# Patient Record
Sex: MALE | Race: WHITE | NOT HISPANIC OR LATINO | Employment: FULL TIME | URBAN - NONMETROPOLITAN AREA
[De-identification: names, ages, dates, MRNs, and addresses within clinical notes are randomized per-mention and may not be internally consistent; named-entity substitution may affect disease eponyms.]

---

## 2020-09-03 ENCOUNTER — OFFICE VISIT (OUTPATIENT)
Dept: ORTHOPEDIC SURGERY | Facility: CLINIC | Age: 45
End: 2020-09-03

## 2020-09-03 VITALS — HEIGHT: 67 IN | BODY MASS INDEX: 31.47 KG/M2 | WEIGHT: 200.5 LBS

## 2020-09-03 DIAGNOSIS — E11.9 TYPE 2 DIABETES MELLITUS WITHOUT COMPLICATION, WITHOUT LONG-TERM CURRENT USE OF INSULIN (HCC): ICD-10-CM

## 2020-09-03 DIAGNOSIS — G89.29 CHRONIC LEFT SHOULDER PAIN: ICD-10-CM

## 2020-09-03 DIAGNOSIS — M77.12 LATERAL EPICONDYLITIS, LEFT ELBOW: Primary | ICD-10-CM

## 2020-09-03 DIAGNOSIS — M79.602 LEFT ARM PAIN: ICD-10-CM

## 2020-09-03 DIAGNOSIS — M25.512 CHRONIC LEFT SHOULDER PAIN: ICD-10-CM

## 2020-09-03 PROCEDURE — 99203 OFFICE O/P NEW LOW 30 MIN: CPT | Performed by: ORTHOPAEDIC SURGERY

## 2020-09-03 RX ORDER — LISINOPRIL 10 MG/1
TABLET ORAL
COMMUNITY
Start: 2020-03-16 | End: 2020-09-13

## 2020-09-03 RX ORDER — ROSUVASTATIN CALCIUM 10 MG/1
10 TABLET, COATED ORAL
COMMUNITY
Start: 2020-05-27

## 2020-09-03 NOTE — PROGRESS NOTES
Mukul Hillman is a 45 y.o. male   Primary provider:  Binh May MD       Chief Complaint   Patient presents with   • Left Upper Arm - Pain   • Left Elbow - Pain   • Left Shoulder - Pain   • Establish Care       HISTORY OF PRESENT ILLNESS: Patient being seen for left upper arm pain. Reports no known injury. No recent x-rays done.   Patient is having pain in his left shoulder and left elbow.  He mostly has pain with lifting and pulling.  Patient states that he has no specific injury but is been having intermittent pain for approximately 1 to 2 years.  He feels like he has a nodule in his deltoid.  He has some occasional numbness and tingling but it goes away with positional change.  His pain is worse with activity.  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  He reports no pain at rest.  He is not having difficulty sleeping at night.  Pain in his elbow seems to be a worse and affects him lifting pulling and tugging.    Pain   This is a chronic problem. The current episode started more than 1 year ago (2 years). Associated symptoms comments: aching. He has tried ice and heat for the symptoms.        CONCURRENT MEDICAL HISTORY:    Past Medical History:   Diagnosis Date   • Diabetes (CMS/[facility])        Allergies   Allergen Reactions   • Moxifloxacin Hives   • Sulfa Antibiotics Hives         Current Outpatient Medications:   •  lisinopril (PRINIVIL,ZESTRIL) 10 MG tablet, TAKE 1 TABLET BY MOUTH EVERY DAY, Disp: , Rfl:   •  metFORMIN (GLUCOPHAGE) 850 MG tablet, TAKE 1 TABLET (850 MG) BY MOUTH 2 TIMES DAILY (WITH MEALS), Disp: , Rfl:   •  rosuvastatin (CRESTOR) 10 MG tablet, Take 10 mg by mouth every night at bedtime., Disp: , Rfl:     History reviewed. No pertinent surgical history.    Family History   Problem Relation Age of Onset   • Hypertension Other         Social History     Socioeconomic History   • Marital status:      Spouse name: Not on file   • Number of children: Not on file   • Years of education:  "Not on file   • Highest education level: Not on file   Tobacco Use   • Smoking status: Current Every Day Smoker     Packs/day: 1.00     Years: 33.00     Pack years: 33.00     Types: Cigarettes   • Smokeless tobacco: Never Used   Substance and Sexual Activity   • Alcohol use: Yes     Alcohol/week: 3.0 standard drinks     Types: 3 Cans of beer per week        Review of Systems   Constitutional: Negative.    HENT: Negative.    Eyes: Negative.    Respiratory: Negative.    Cardiovascular: Negative.    Gastrointestinal: Negative.    Endocrine: Negative.    Genitourinary: Negative.    Musculoskeletal:        Elbow and left shoulder pain   Skin: Negative.    Allergic/Immunologic: Negative.    Neurological: Negative.    Hematological: Negative.    Psychiatric/Behavioral: Negative.        PHYSICAL EXAMINATION:       Ht 170.2 cm (67\")   Wt 90.9 kg (200 lb 8 oz)   BMI 31.40 kg/m²     Physical Exam   Constitutional: He is oriented to person, place, and time. He appears well-developed and well-nourished.   Neurological: He is alert and oriented to person, place, and time.   Psychiatric: He has a normal mood and affect. His behavior is normal. Judgment and thought content normal.       GAIT:     [x]  Normal  []  Antalgic    Assistive device: [x]  None  []  Walker     []  Crutches  []  Cane     []  Wheelchair  []  Stretcher    Right Elbow Exam     Tenderness   The patient is experiencing no tenderness.     Range of Motion   The patient has normal right elbow ROM.    Muscle Strength   The patient has normal right elbow strength.    Tests   Varus: negative  Valgus: negative    Other   Erythema: absent  Sensation: normal  Pulse: present      Left Elbow Exam     Tenderness   The patient is experiencing tenderness in the lateral epicondyle.     Range of Motion   The patient has normal left elbow ROM.    Tests   Varus: negative  Valgus: negative    Other   Erythema: absent  Sensation: normal  Pulse: present    Comments:  He has pain " with resisted wrist extension and pain with resisted supination.  He has good strength of the elbow in flexion and extension.  Mild  strength weakness due to pain with extension of the wrist.      Left Shoulder Exam     Tenderness   The patient is experiencing no tenderness.     Range of Motion   The patient has normal left shoulder ROM.    Muscle Strength   The patient has normal left shoulder strength.    Tests   Low test: positive (Mild)  Impingement: positive (Mild)    Other   Erythema: absent  Sensation: normal  Pulse: present               Xr Shoulder 2+ View Left    Result Date: 9/3/2020  Narrative: Ordering Provider:  Cristian Gross MD Ordering Diagnosis/Indication:  Left arm pain Procedure:  XR SHOULDER 2+ VW LEFT Exam Date:  9/3/20 COMPARISON:  Not applicable, no relevant images available.     Impression:  3 views of the left shoulder show settable position and alignment of the glenohumeral joint with no evidence of acute bony abnormality.  No fracture or dislocation is noted.  Moderate arthritic changes noted in the AC joint with subclavicular and sub-acromial spurring.  No acute findings. Cristian Gross MD 9/3/20     Xr Humerus Left    Result Date: 9/3/2020  Narrative: Ordering Provider:  Cristian Gross MD Ordering Diagnosis/Indication:  Left arm pain Procedure:  XR HUMERUS LEFT Exam Date:  9/3/20 COMPARISON:  Not applicable, no relevant images available.     Impression:  Internal and external rotation views of the left humerus show acceptable position and alignment with no evidence of acute bony abnormality.  No fracture or dislocation is noted.  AC joint arthritic changes noted moderate in nature.  No significant glenohumeral joint changes are noted. Cristian Gross MD 9/3/20           ASSESSMENT:    Diagnoses and all orders for this visit:    Lateral epicondylitis, left elbow  -     Miscellaneous DME  -     Ambulatory Referral to Physical Therapy Evaluate and  treat (4-6  visits, modalities, Teach HEP)    Left arm pain  -     XR Shoulder 2+ View Left; Future  -     XR Humerus Left; Future  -     Miscellaneous DME  -     Ambulatory Referral to Physical Therapy Evaluate and treat (4-6  visits, modalities, Teach HEP)    Chronic left shoulder pain  -     Ambulatory Referral to Physical Therapy Evaluate and treat (4-6  visits, modalities, Teach HEP)    Type 2 diabetes mellitus without complication, without long-term current use of insulin (CMS/[facility])    Other orders  -     lisinopril (PRINIVIL,ZESTRIL) 10 MG tablet; TAKE 1 TABLET BY MOUTH EVERY DAY  -     metFORMIN (GLUCOPHAGE) 850 MG tablet; TAKE 1 TABLET (850 MG) BY MOUTH 2 TIMES DAILY (WITH MEALS)  -     rosuvastatin (CRESTOR) 10 MG tablet; Take 10 mg by mouth every night at bedtime.          PLAN    We discussed using a lateral epicondylitis strap for his elbow.    We also discussed using ibuprofen on a regular basis.      He is going to begin physical therapy for range of motion and strengthening, modalities, and instruction on a home exercise program.    We discussed strategies in lifting and pulling and tugging to avoid the use of the wrist extensors and rely more on biceps.  We will see how he responds and see how his shoulder responds to treatment of his elbow.  We discussed the possibility of injection or further imaging depending on how his symptoms change.    Patient's Body mass index is 31.4 kg/m². BMI is above normal parameters. Recommendations include: exercise counseling and nutrition counseling.      Return in about 6 weeks (around 10/15/2020) for recheck.    Cristian Gross MD